# Patient Record
Sex: MALE | Race: WHITE | Employment: FULL TIME | URBAN - METROPOLITAN AREA
[De-identification: names, ages, dates, MRNs, and addresses within clinical notes are randomized per-mention and may not be internally consistent; named-entity substitution may affect disease eponyms.]

---

## 2022-08-25 ENCOUNTER — HOSPITAL ENCOUNTER (EMERGENCY)
Age: 22
Discharge: HOME | End: 2022-08-26
Attending: EMERGENCY MEDICINE
Payer: COMMERCIAL

## 2022-08-25 ENCOUNTER — APPOINTMENT (OUTPATIENT)
Dept: CT IMAGING | Age: 22
End: 2022-08-25
Payer: COMMERCIAL

## 2022-08-25 VITALS
HEIGHT: 69 IN | WEIGHT: 162 LBS | DIASTOLIC BLOOD PRESSURE: 54 MMHG | RESPIRATION RATE: 16 BRPM | BODY MASS INDEX: 23.99 KG/M2 | HEART RATE: 81 BPM | SYSTOLIC BLOOD PRESSURE: 138 MMHG | OXYGEN SATURATION: 100 % | TEMPERATURE: 97.6 F

## 2022-08-25 DIAGNOSIS — R11.2 NON-INTRACTABLE VOMITING WITH NAUSEA, UNSPECIFIED VOMITING TYPE: Primary | ICD-10-CM

## 2022-08-25 DIAGNOSIS — R10.84 GENERALIZED ABDOMINAL PAIN: ICD-10-CM

## 2022-08-25 LAB
A/G RATIO: 1.5 (ref 1.1–2.2)
ALBUMIN SERPL-MCNC: 4.8 G/DL (ref 3.4–5)
ALP BLD-CCNC: 120 U/L (ref 40–129)
ALT SERPL-CCNC: 21 U/L (ref 10–40)
ANION GAP SERPL CALCULATED.3IONS-SCNC: 11 MMOL/L (ref 3–16)
AST SERPL-CCNC: 26 U/L (ref 15–37)
BASOPHILS ABSOLUTE: 0 K/UL (ref 0–0.2)
BASOPHILS RELATIVE PERCENT: 0.2 %
BILIRUB SERPL-MCNC: <0.2 MG/DL (ref 0–1)
BUN BLDV-MCNC: 16 MG/DL (ref 7–20)
CALCIUM SERPL-MCNC: 10.5 MG/DL (ref 8.3–10.6)
CHLORIDE BLD-SCNC: 102 MMOL/L (ref 99–110)
CO2: 24 MMOL/L (ref 21–32)
CREAT SERPL-MCNC: 1.2 MG/DL (ref 0.9–1.3)
EOSINOPHILS ABSOLUTE: 0 K/UL (ref 0–0.6)
EOSINOPHILS RELATIVE PERCENT: 0.1 %
GFR AFRICAN AMERICAN: >60
GFR NON-AFRICAN AMERICAN: >60
GLUCOSE BLD-MCNC: 153 MG/DL (ref 70–99)
HCT VFR BLD CALC: 41.7 % (ref 40.5–52.5)
HEMOGLOBIN: 13.8 G/DL (ref 13.5–17.5)
LIPASE: 22 U/L (ref 13–60)
LYMPHOCYTES ABSOLUTE: 1.2 K/UL (ref 1–5.1)
LYMPHOCYTES RELATIVE PERCENT: 8.4 %
MCH RBC QN AUTO: 30 PG (ref 26–34)
MCHC RBC AUTO-ENTMCNC: 33.1 G/DL (ref 31–36)
MCV RBC AUTO: 90.7 FL (ref 80–100)
MONOCYTES ABSOLUTE: 0.9 K/UL (ref 0–1.3)
MONOCYTES RELATIVE PERCENT: 6 %
NEUTROPHILS ABSOLUTE: 12.2 K/UL (ref 1.7–7.7)
NEUTROPHILS RELATIVE PERCENT: 85.3 %
PDW BLD-RTO: 13.1 % (ref 12.4–15.4)
PLATELET # BLD: 248 K/UL (ref 135–450)
PMV BLD AUTO: 7.4 FL (ref 5–10.5)
POTASSIUM REFLEX MAGNESIUM: 4.1 MMOL/L (ref 3.5–5.1)
RBC # BLD: 4.6 M/UL (ref 4.2–5.9)
SODIUM BLD-SCNC: 137 MMOL/L (ref 136–145)
TOTAL PROTEIN: 7.9 G/DL (ref 6.4–8.2)
WBC # BLD: 14.3 K/UL (ref 4–11)

## 2022-08-25 PROCEDURE — 6360000002 HC RX W HCPCS

## 2022-08-25 PROCEDURE — 83690 ASSAY OF LIPASE: CPT

## 2022-08-25 PROCEDURE — 6370000000 HC RX 637 (ALT 250 FOR IP): Performed by: EMERGENCY MEDICINE

## 2022-08-25 PROCEDURE — 99284 EMERGENCY DEPT VISIT MOD MDM: CPT

## 2022-08-25 PROCEDURE — 85025 COMPLETE CBC W/AUTO DIFF WBC: CPT

## 2022-08-25 PROCEDURE — 74176 CT ABD & PELVIS W/O CONTRAST: CPT

## 2022-08-25 PROCEDURE — 80053 COMPREHEN METABOLIC PANEL: CPT

## 2022-08-25 PROCEDURE — 96374 THER/PROPH/DIAG INJ IV PUSH: CPT

## 2022-08-25 RX ORDER — ONDANSETRON 2 MG/ML
INJECTION INTRAMUSCULAR; INTRAVENOUS
Status: COMPLETED
Start: 2022-08-25 | End: 2022-08-25

## 2022-08-25 RX ORDER — ACETAMINOPHEN 160 MG/5ML
650 SOLUTION ORAL ONCE
Status: COMPLETED | OUTPATIENT
Start: 2022-08-25 | End: 2022-08-25

## 2022-08-25 RX ORDER — ACETAMINOPHEN 500 MG
1000 TABLET ORAL ONCE
Status: DISCONTINUED | OUTPATIENT
Start: 2022-08-25 | End: 2022-08-25

## 2022-08-25 RX ORDER — ONDANSETRON 2 MG/ML
4 INJECTION INTRAMUSCULAR; INTRAVENOUS ONCE
Status: COMPLETED | OUTPATIENT
Start: 2022-08-25 | End: 2022-08-25

## 2022-08-25 RX ADMIN — ONDANSETRON 4 MG: 2 INJECTION INTRAMUSCULAR; INTRAVENOUS at 22:34

## 2022-08-25 RX ADMIN — ALUMINUM HYDROXIDE, MAGNESIUM HYDROXIDE, AND SIMETHICONE: 200; 200; 20 SUSPENSION ORAL at 22:01

## 2022-08-25 RX ADMIN — ACETAMINOPHEN 650 MG: 650 SOLUTION ORAL at 22:00

## 2022-08-25 ASSESSMENT — ENCOUNTER SYMPTOMS
DIARRHEA: 0
NAUSEA: 1
EYE PAIN: 0
VOMITING: 1
BACK PAIN: 0
COUGH: 0
ABDOMINAL PAIN: 1
RHINORRHEA: 0

## 2022-08-25 ASSESSMENT — PAIN SCALES - GENERAL
PAINLEVEL_OUTOF10: 5
PAINLEVEL_OUTOF10: 0

## 2022-08-25 ASSESSMENT — PAIN - FUNCTIONAL ASSESSMENT: PAIN_FUNCTIONAL_ASSESSMENT: NONE - DENIES PAIN

## 2022-08-26 RX ORDER — ONDANSETRON 4 MG/1
4 TABLET, ORALLY DISINTEGRATING ORAL EVERY 8 HOURS PRN
Qty: 20 TABLET | Refills: 0 | Status: SHIPPED | OUTPATIENT
Start: 2022-08-26

## 2022-08-26 RX ORDER — DICYCLOMINE HYDROCHLORIDE 10 MG/5ML
20 SOLUTION ORAL 3 TIMES DAILY PRN
Qty: 120 ML | Refills: 0 | Status: SHIPPED | OUTPATIENT
Start: 2022-08-26

## 2022-08-26 RX ORDER — ACETAMINOPHEN 325 MG/1
2 TABLET, CHEWABLE ORAL 3 TIMES DAILY PRN
Qty: 30 TABLET | Refills: 0 | Status: SHIPPED | OUTPATIENT
Start: 2022-08-26

## 2022-08-26 NOTE — ED PROVIDER NOTES
[facility]  ED PROVIDER NOTE    Patient Identification  Pt Name: Oksana Robertson  MRN: [id number]  [place] 2000  Date of evaluation: 8/25/2022  Provider: Jenni Oh MD  PCP: Teodora AGUILERA  Oksana Robertson is a 23 y.o. male who presents to the ED for nausea and vomiting x 2. No blood. Had had a red bull about 20-30 mins earlier. Had had a hamburger for dinner about 2.5 hours earlier. He felt nauseated beforehand but had no pain except some cramping pain while vomiting. EMS gave fluids and zofran and he is no longer nauseated but there is still some residual cramping. He had not had any water today. He was in a line at a concert and started to feel hot and lightheaded then his nausea and vomiting started. Cramping sharp pain presently. He does it spicey foods. Last BM about 2 days ago and it was normal    No other complaints, modifying factors or associated symptoms. Nursing notes reviewed. Allergies: Patient has no known allergies. Past medical history: History reviewed. No pertinent past medical history. Past surgical history: History reviewed. No pertinent surgical history. Home medications:   Discharge Medication List as of 8/26/2022 12:12 AM          Social history:  reports that he does not drink alcohol and does not use drugs. Family history:  History reviewed. No pertinent family history. REVIEW OF SYSTEMS  Review of Systems   Constitutional:  Negative for fever. HENT:  Negative for rhinorrhea. Eyes:  Negative for pain. Respiratory:  Negative for cough. Cardiovascular:  Negative for chest pain. Gastrointestinal:  Positive for abdominal pain, nausea and vomiting. Negative for diarrhea. Genitourinary:  Negative for difficulty urinating, dysuria and testicular pain. Musculoskeletal:  Negative for back pain. Neurological:  Positive for light-headedness. Negative for headaches.        PHYSICAL EXAM  BP (!) 138/54   Pulse 81   Temp 97.6 °F (36.4 °C)   Resp 16   Ht 5' 9\" (1.753 m)   Wt 162 lb (73.5 kg)   SpO2 100%   BMI 23.92 kg/m²     Physical Exam  Vitals and nursing note reviewed. Constitutional:       Appearance: Normal appearance. He is well-developed. He is not ill-appearing. HENT:      Head: Normocephalic and atraumatic. Right Ear: External ear normal.      Left Ear: External ear normal.      Nose: Nose normal.   Eyes:      General: No scleral icterus. Right eye: No discharge. Left eye: No discharge. Conjunctiva/sclera: Conjunctivae normal.   Cardiovascular:      Rate and Rhythm: Normal rate and regular rhythm. Heart sounds: Normal heart sounds. Pulmonary:      Effort: Pulmonary effort is normal. No respiratory distress. Breath sounds: Normal breath sounds. No wheezing or rales. Abdominal:      General: Bowel sounds are normal. There is no distension. Palpations: Abdomen is soft. Tenderness: There is generalized abdominal tenderness. There is no guarding or rebound. Musculoskeletal:         General: No swelling, tenderness, deformity or signs of injury. Cervical back: Neck supple. Skin:     Coloration: Skin is not pale. Neurological:      Mental Status: He is alert. Psychiatric:         Mood and Affect: Mood normal.         Behavior: Behavior normal.         PROCEDURES        EKG:    RADIOLOGY  CT ABDOMEN PELVIS WO CONTRAST Additional Contrast? None   Final Result   1. No ileus, obstruction, or acute inflammatory bowel process. No gastric   distention. 2. No urinary tract calculi or obstruction. 3. Unremarkable gallbladder.                 LABS  Labs Reviewed   CBC WITH AUTO DIFFERENTIAL - Abnormal; Notable for the following components:       Result Value    WBC 14.3 (*)     Neutrophils Absolute 12.2 (*)     All other components within normal limits   COMPREHENSIVE METABOLIC PANEL W/ REFLEX TO MG FOR LOW K - Abnormal; Notable for the following components: Glucose 153 (*)     All other components within normal limits   LIPASE       ED COURSE/MDM  Patient seen and evaluated. Patient is nontoxic-appearing he does have generalized with mild abdominal tenderness. Patient was given Tylenol and a GI cocktail. After which she was given a p.o. challenge. The patient has already been given IV fluids and Zofran by EMS. The patient vomited. Basic laboratory studies ordered. He has leukocytosis. A CT scan of the abdomen is ordered. No acute processes identified. Patient reassessed. He now has no pain he is not nauseated he is feeling like he is able to go home. I do feel that Vahe Iverson can be safely discharged home due to being low risk for serious or life-threatening conditions. Outpatient follow-up  recommended. Return instructions discussed. Patient and or family expresses understanding and is in agreement with plan. Is this patient to be included in the SEP-1 Core Measure due to severe sepsis or septic shock? No   Exclusion criteria - the patient is NOT to be included for SEP-1 Core Measure due to: Infection is not suspected       Patient Referrals:    PCP in 3-5 days        [facility]  ED  [facility] [address]  [phone]    If symptoms worsen    Discharge Medications:  Discharge Medication List as of 8/26/2022 12:12 AM        START taking these medications    Details   ondansetron (ZOFRAN ODT) 4 MG disintegrating tablet Take 1 tablet by mouth every 8 hours as needed for Nausea, Disp-20 tablet, R-0Print      dicyclomine (BENTYL) 10 MG/5ML syrup Take 10 mLs by mouth 3 times daily as needed (ab pain), Disp-120 mL, R-0Print      Acetaminophen 325 MG CHEW Take 2 tablets by mouth 3 times daily as needed (ab pain), Disp-30 tablet, R-0Print             FINAL IMPRESSION  1. Non-intractable vomiting with nausea, unspecified vomiting type    2.  Generalized abdominal pain        Blood pressure (!) 138/54, pulse 81, temperature 97.6 °F (36.4 °C), resp. rate 16, height 5' 9\" (1.753 m), weight 162 lb (73.5 kg), SpO2 100 %. DISPOSITION  DISPOSITION Decision To Discharge 08/26/2022 12:03:10 AM        Electronically signed by: Tonie Clark M.D. I am the primary clinician of record. This chart was generated using the [address] [place], [place] [address]  dictation system. I created this record but it may contain dictation errors given the limitations of this technology.         Kourtney Rehman MD  08/26/22 [id number]

## 2022-08-26 NOTE — ED NOTES
Pt did not pass PO challenge. Episode of emesis at this time. Pt mother out of room stating \"his stomach is very tight\". Pt and mother educated that this is the process when one vomits.        Arthur Monsivais RN  08/25/22 [id number]

## 2022-08-26 NOTE — ED NOTES
Dr. She Velez aware of pt episode of emesis. MD to place orders for labs at this time.        Samm Haile RN  08/25/22 [id number]

## 2022-08-26 NOTE — ED NOTES
Pt ok to d/c to home. Pt given d/c instructions. Pt verbalized understating including Rx and follow up care. [facility].  0 s/s of distress at time of d/c.          Isidro Menjivar RN  08/26/22 [id number]